# Patient Record
(demographics unavailable — no encounter records)

---

## 2024-12-10 NOTE — PHYSICAL EXAM
[General Appearance - Alert] : alert [General Appearance - In No Acute Distress] : in no acute distress [Oriented To Time, Place, And Person] : oriented to person, place, and time [Motor Tone] : muscle tone was normal in all four extremities [FreeTextEntry6] : BUE/BLE 5/5 strength

## 2024-12-10 NOTE — ASSESSMENT
[FreeTextEntry1] : Ms. Jolanta Ramirez is a very pleasant 67 year old female with a history of hypertension, diabetes, heart disease on aspirin who presents with several years of on and off left-sided headaches and neck pain.  On exam, she has full strength without any red flag symptoms.  I reviewed her CAT scan results with her and her  that is without acute abnormality.  I will order an MRI of her brain and cervical spine for further evaluation.  I have also prescribed her physical therapy and pain management, as well as a trial of tizanidine. I will see her back in 6 weeks. All questions answered.  She knows to call my office with any issues or concerns.

## 2024-12-10 NOTE — HISTORY OF PRESENT ILLNESS
[FreeTextEntry1] : left headache and neck pain [de-identified] : Ms. Jolanta Ramirez is a very pleasant 67 year old female with a history of hypertension, diabetes, heart disease on aspirin who presents with several years of on and off left-sided headaches and neck pain.  She reports that her symptoms began after an accident in 2000 and she underwent injections which significantly helped.  In the past few months, her pain has worsened without inciting event.  At its worst the pain is 8-9 out of 10.  Currently today it is rated 5-6 out of 10.  The pain involves her left temple, the left side of her neck into her shoulder down the outside of her left arm and into the backside of her entire left hand.  Her symptoms worsen with movement.  Nothing makes it better.  She has not tried any medications.  No recent physical therapy or injections.  She is also had 3 instances of imbalance.   Gilbert [id number]  PSH: multiple abdominal and orthopedic surgeries Lives at home with  and children Previously worked at [employer] for 23 years

## 2025-01-06 NOTE — HISTORY OF PRESENT ILLNESS
[FreeTextEntry1] : KARISHMA EDWARDS is a 67 year old female with PMH of ACS, cardiac cath, HTN, HLD, DM, cholecystectomy, JAMI, presenting today for evaluation of lower abdominal pain. She reports the pain started about 2-3 months ago and is sharp and crampy in nature. It lasts for 1-2 hours at a time. She usually needs to move her bowels once the pain starts but having a BM does not relieve the pain. She denies constipation and moves her bowels every 1-2 days. The pain does not occur daily. There are times where is happened twice a week and then maybe twice a month after that. She denies fever, nausea, vomiting, weight loss, rectal bleeding. Last colonoscopy was in 2022 and was normal other than hemorrhoids. She reports that her PCP sent her for an abdominal and pelvic US but we do not have results. She alleges that everything was normal. Denies urinary symptoms.

## 2025-01-06 NOTE — REASON FOR VISIT
[Follow-up] : a follow-up of an existing diagnosis [Spouse] : spouse [[employer] ] : provided by [employer]   [Time Spent: ____ minutes] : Total time spent using  services: [unfilled] minutes. The patient's primary language is not English thus required  services. [FreeTextEntry1] : lower abdominal pain [Interpreters_IDNumber] : [id number] [Interpreters_FullName] : Bridgette [TWNoteComboBox1] : [place]

## 2025-01-06 NOTE — ASSESSMENT
[FreeTextEntry1] : 67 year old female with 2-3 month history of severe, intermittent abdominal pain. Mild suprapubic tenderness on exam but denies urinary symptoms. Will schedule CT Abd/Pelvis to rule out diverticulitis.   Possible irritable bowel syndrome. Benefiber recommended. Can consider Dicyclomine in future if CT is negative.

## 2025-01-06 NOTE — PHYSICAL EXAM
[Alert] : alert [Normal Voice/Communication] : normal voice/communication [Healthy Appearing] : healthy appearing [No Acute Distress] : no acute distress [Sclera] : the sclera and conjunctiva were normal [Hearing Threshold Finger Rub Not [place]] : hearing was normal [Normal Lips/Gums] : the lips and gums were normal [Oropharynx] : the oropharynx was normal [Normal Appearance] : the appearance of the neck was normal [No Neck Mass] : no neck mass was observed [No Respiratory Distress] : no respiratory distress [No Acc Muscle Use] : no accessory muscle use [Respiration, Rhythm And Depth] : normal respiratory rhythm and effort [Auscultation Breath Sounds / Voice Sounds] : lungs were clear to auscultation bilaterally [Heart Rate And Rhythm] : heart rate was normal and rhythm regular [Normal S1, S2] : normal S1 and S2 [Murmurs] : no murmurs [Bowel Sounds] : normal bowel sounds [No Masses] : no abdominal mass palpated [Abdomen Soft] : soft [] : no hepatosplenomegaly [Suprapubic] : in the suprapubic area [Oriented To Time, Place, And Person] : oriented to person, place, and time

## 2025-01-28 NOTE — HISTORY OF PRESENT ILLNESS
[FreeTextEntry1] : Ms. Jolanta Ramirez is a very pleasant 67 year old female with a history of hypertension, diabetes, heart disease on aspirin who presents for follow-up of several years of on and off left-sided headaches and neck pain.  She was last seen at the beginning of December was recommended for conservative management.  She returns today and reports that her pain is overall better with starting medications per Dr. Petersen and physical therapy.  She denies any new symptoms.   Reji [id number]

## 2025-01-28 NOTE — ASSESSMENT
[FreeTextEntry1] : Ms. Jolanta Ramirez is a very pleasant 67 year old female with a history of hypertension, diabetes, heart disease on aspirin who presents for follow-up of several years of on and off left-sided headaches and neck pain that has fortunately improved with conservative management.  We reviewed her MRI of her brain and cervical spine which did not show an acute abnormality.  As her pain is significantly improved, she should continue conservative management with following up with Dr. Petersen and physical therapy.  I will see her back in 3 months to check on how she is doing. All questions answered.  She knows to call my office with any issues or concerns.